# Patient Record
Sex: FEMALE | Race: BLACK OR AFRICAN AMERICAN | Employment: FULL TIME | URBAN - METROPOLITAN AREA
[De-identification: names, ages, dates, MRNs, and addresses within clinical notes are randomized per-mention and may not be internally consistent; named-entity substitution may affect disease eponyms.]

---

## 2017-08-15 ENCOUNTER — OFFICE VISIT (OUTPATIENT)
Dept: NEUROLOGY | Age: 27
End: 2017-08-15

## 2017-08-15 VITALS
TEMPERATURE: 99.1 F | RESPIRATION RATE: 12 BRPM | SYSTOLIC BLOOD PRESSURE: 98 MMHG | DIASTOLIC BLOOD PRESSURE: 60 MMHG | WEIGHT: 120 LBS | BODY MASS INDEX: 23.56 KG/M2 | HEIGHT: 60 IN | OXYGEN SATURATION: 98 % | HEART RATE: 114 BPM

## 2017-08-15 DIAGNOSIS — I95.1 ORTHOSTATIC HYPOTENSION: Primary | ICD-10-CM

## 2017-08-15 NOTE — PROGRESS NOTES
Shilo Quevedo is a 32 y.o., right handed female, with no significant medical history except for some low blood pressure in the past, who comes in complaining of dizziness since she became pregnant about 3 months ago. She has a combination of light headedness and the sense the room is spinning about her. She's just 3 months pregnant. This seems to occur predominantly when she goes from the laying or sitting position to standing. This seems to even if she gets up slowly. This will only rarely happen if she's sitting still, mostly occurring with position change. She hasn't lost a lot of weight recently. She's not on a salt restricted diet. She did have one episode of loss of consciousness with severe lightheadedness associated with position changes. Social History; she's , lives with her  and children. She doesn't smoke, drink or use illicit drugs. Works as a . Family History; mother with migraine headaches. Father  from an accident. Brothers all 3 in good health. Current Outpatient Prescriptions   Medication Sig Dispense Refill    DOCOSAHEXANOIC ACID (PRENATAL DHA PO) Take  by mouth. No past medical history on file. No past surgical history on file. No Known Allergies    There is no problem list on file for this patient.         Review of Systems:   As above otherwise 11 point review of systems negative including;   Constitutional no fever or chills  Skin denies rash or itching  HENT  Denies tinnitus, hearing lose  Eyes denies diplopia vision lose  Respiratory denies shortness of breath  Cardiovascular denies chest pain, dyspnea on exertion  Gastrointestinal denies nausea, vomiting, diarrhea, constipation  Genitourinary denies incontinence  Musculoskeletal denies joint pain or swelling  Endocrine denies weight change  Hematology denies easy bruising or bleeding   Neurological as above in HPI      PHYSICAL EXAMINATION:      VITAL SIGNS:    Visit Vitals    BP 98/60 (BP 1 Location: Left arm, BP Patient Position: Standing)    Pulse (!) 114    Temp 99.1 °F (37.3 °C) (Oral)    Resp 12    Ht 5' (1.524 m)    Wt 54.4 kg (120 lb)    SpO2 98%    BMI 23.44 kg/m2     BP laying 118/62 pulse was 80  BP standing 98/60 pulse was 114. GENERAL: The patient is well developed, well nourished, and in no apparent distress. EXTREMITIES: No clubbing, cyanosis, or edema is identified. Pulses 2+ and symmetrical.  Muscle tone is normal.  HEAD:   Ear, nose, and throat appear to be without trauma. The patient is normocephalic. NEUROLOGIC EXAMINATION    MENTAL STATUS: The patient is awake, alert, and oriented x 4. Fund of knowledge is adequate. Speech is fluent and memory appears to be intact, both long and short term. CRANIAL NERVES: II  Visual fields are full to confrontation. Funduscopic examination reveals flat disks bilaterally. Pupils are both 4 mm and briskly reactive to light and accommodation. III, IV, VI  Extraocular movements are intact and there is no nystagmus. V  Facial sensation is intact to pinprick and light touch. VII  Face is symmetrical.   VIII - Hearing is present. IX, X, [address] rises symmetrically. Gag is present. Tongue is in the midline. XI - Shoulder shrugging and head turning intact  MOTOR:  The patient is 5/5 in all four limbs without any drift. Fine finger movements are symmetrical.  Isolated motor group testing reveals no focal abnormalities. Tone is normal.  Sensory examination is intact to pinprick, light touch and position sense testing. Reflexes are 2+ and symmetrical. Plantars are down going. Cerebellar examination reveals no gross ataxia or dysmetria. Gait is normal and the patient can tandem walk without any difficulty.        CBC: No results found for: WBC, RBC, HGB, HCT, PLT, HGBEXT, HCTEXT, PLTEXT  BMP: No results found for: GLU, NA, K, CL, CO2, BUN, CREA, CA  CMP: No results found for: GLU, NA, K, CL, CO2, BUN, CREA, CA, AGAP, BUCR, TBIL, GPT, AP, TP, ALB, GLOB, AGRAT  Coagulation: No results found for: PTP, INR, APTT, PTTT  Cardiac markers: No results found for: CPK, CKND1, JOSÉ MIGUEL       Impression: Significant orthostatic drop in her blood pressure of 20 mmHg in this patient who has risk factors including pre-existing hypotension. She seems to be symptomatic with a drop in her BP of 20 mmHg. Plan: From my standpoint there's not much to do except encourage her to stay hydrated. Hopefull as she gains weight with this pregnancy the orthostatic drops are better. I'd be hesitant to prescribe any agent to try to raise her BP. [place] use of table salt may help. With little to offer will see here as needed only.

## 2017-08-15 NOTE — MR AVS SNAPSHOT
Visit Information Date & Time Provider Department Dept. Phone Encounter #  
 8/15/2017 10:00 AM Regulo Bowden MD [facility] at [facility] [id number] Follow-up Instructions Return if symptoms worsen or fail to improve. Follow-up and Disposition History Upcoming Health Maintenance Date Due DTaP/Tdap/Td series (1 - Tdap) 1/18/2011 PAP AKA CERVICAL CYTOLOGY 1/18/2011 INFLUENZA AGE 9 TO ADULT 8/1/2017 Allergies as of 8/15/2017  Review Complete On: 8/15/2017 By: Lily Thompson LPN No Known Allergies Current Immunizations  Never Reviewed No immunizations on file. Not reviewed this visit You Were Diagnosed With   
  
 Codes Comments Orthostatic hypotension    -  Primary ICD-10-CM: I95.1 ICD-9-CM: 458.0 Vitals BP Pulse Temp Resp Height(growth percentile) Weight(growth percentile) 98/60 (BP 1 Location: Left arm, BP Patient Position: Standing) (!) 114 99.1 °F (37.3 °C) (Oral) 12 5' (1.524 m) 120 lb (54.4 kg) SpO2 BMI OB Status Smoking Status 98% 23.44 kg/m2 Pregnant Never Smoker Vitals History BMI and BSA Data Body Mass Index Body Surface Area  
 23.44 kg/m 2 1.52 m 2 Your Updated Medication List  
  
   
This list is accurate as of: 8/15/17 10:56 AM.  Always use your most recent med list.  
  
  
  
  
 PRENATAL DHA PO Take  by mouth. Follow-up Instructions Return if symptoms worsen or fail to improve. Introducing [facility]! Bruna Florez introduces [employer] patient portal. Now you can access parts of your medical record, email your doctor's office, and request medication refills online. 1. In your internet browser, go to [email or web address] 2. Click on the First Time User? Click Here link in the Sign In box. You will see the New Member Sign Up page. 3. Enter your [employer] Access Code exactly as it appears below.  You will not need to use this code after youve completed the sign-up process. If you do not sign up before the expiration date, you must request a new code. · [employer] Access Code: [email or web address] Expires: 11/13/2017 10:03 AM 
 
4. Enter the last four digits of your Social Security Number (xxxx) and Date of Birth (mm/dd/yyyy) as indicated and click Submit. You will be taken to the next sign-up page. 5. Create a [employer]) ID. This will be your [employer] login ID and cannot be changed, so think of one that is secure and easy to remember. 6. Create a [employer] password. You can change your password at any time. 7. Enter your Password Reset Question and Answer. This can be used at a later time if you forget your password. 8. Enter your e-mail address. You will receive e-mail notification when new information is available in [address]. 9. Click Sign Up. You can now view and download portions of your medical record. 10. Click the Download Summary menu link to download a portable copy of your medical information. If you have questions, please visit the Frequently Asked Questions section of the [employer] website. Remember, [employer] is NOT to be used for urgent needs. For medical emergencies, dial 911. Now available from your iPhone and Android! Please provide this summary of care documentation to your next provider. If you have any questions after today's visit, please call [phone].

## 2017-08-15 NOTE — COMMUNICATION BODY
Maria Luisa Laird is a 32 y.o., right handed female, with no significant medical history except for some low blood pressure in the past, who comes in complaining of dizziness since she became pregnant about 3 months ago. She has a combination of light headedness and the sense the room is spinning about her. She's just 3 months pregnant. This seems to occur predominantly when she goes from the laying or sitting position to standing. This seems to even if she gets up slowly. This will only rarely happen if she's sitting still, mostly occurring with position change. She hasn't lost a lot of weight recently. She's not on a salt restricted diet. She did have one episode of loss of consciousness with severe lightheadedness associated with position changes. Social History; she's , lives with her  and children. She doesn't smoke, drink or use illicit drugs. Works as a . Family History; mother with migraine headaches. Father  from an accident. Brothers all 3 in good health. Current Outpatient Prescriptions   Medication Sig Dispense Refill    DOCOSAHEXANOIC ACID (PRENATAL DHA PO) Take  by mouth. No past medical history on file. No past surgical history on file. No Known Allergies    There is no problem list on file for this patient.         Review of Systems:   As above otherwise 11 point review of systems negative including;   Constitutional no fever or chills  Skin denies rash or itching  HENT  Denies tinnitus, hearing lose  Eyes denies diplopia vision lose  Respiratory denies shortness of breath  Cardiovascular denies chest pain, dyspnea on exertion  Gastrointestinal denies nausea, vomiting, diarrhea, constipation  Genitourinary denies incontinence  Musculoskeletal denies joint pain or swelling  Endocrine denies weight change  Hematology denies easy bruising or bleeding   Neurological as above in HPI      PHYSICAL EXAMINATION:      VITAL SIGNS:    Visit Vitals    BP 98/60 (BP 1 Location: Left arm, BP Patient Position: Standing)    Pulse (!) 114    Temp 99.1 °F (37.3 °C) (Oral)    Resp 12    Ht 5' (1.524 m)    Wt 54.4 kg (120 lb)    SpO2 98%    BMI 23.44 kg/m2     BP laying 118/62 pulse was 80  BP standing 98/60 pulse was 114. GENERAL: The patient is well developed, well nourished, and in no apparent distress. EXTREMITIES: No clubbing, cyanosis, or edema is identified. Pulses 2+ and symmetrical.  Muscle tone is normal.  HEAD:   Ear, nose, and throat appear to be without trauma. The patient is normocephalic. NEUROLOGIC EXAMINATION    MENTAL STATUS: The patient is awake, alert, and oriented x 4. Fund of knowledge is adequate. Speech is fluent and memory appears to be intact, both long and short term. CRANIAL NERVES: II  Visual fields are full to confrontation. Funduscopic examination reveals flat disks bilaterally. Pupils are both 4 mm and briskly reactive to light and accommodation. III, IV, VI  Extraocular movements are intact and there is no nystagmus. V  Facial sensation is intact to pinprick and light touch. VII  Face is symmetrical.   VIII - Hearing is present. IX, X, [address] rises symmetrically. Gag is present. Tongue is in the midline. XI - Shoulder shrugging and head turning intact  MOTOR:  The patient is 5/5 in all four limbs without any drift. Fine finger movements are symmetrical.  Isolated motor group testing reveals no focal abnormalities. Tone is normal.  Sensory examination is intact to pinprick, light touch and position sense testing. Reflexes are 2+ and symmetrical. Plantars are down going. Cerebellar examination reveals no gross ataxia or dysmetria. Gait is normal and the patient can tandem walk without any difficulty.        CBC: No results found for: WBC, RBC, HGB, HCT, PLT, HGBEXT, HCTEXT, PLTEXT  BMP: No results found for: GLU, NA, K, CL, CO2, BUN, CREA, CA  CMP: No results found for: GLU, NA, K, CL, CO2, BUN, CREA, CA, AGAP, BUCR, TBIL, GPT, AP, TP, ALB, GLOB, AGRAT  Coagulation: No results found for: PTP, INR, APTT, PTTT  Cardiac markers: No results found for: CPK, CKND1, JOSÉ MIGUEL       Impression: Significant orthostatic drop in her blood pressure of 20 mmHg in this patient who has risk factors including pre-existing hypotension. She seems to be symptomatic with a drop in her BP of 20 mmHg. Plan: From my standpoint there's not much to do except encourage her to stay hydrated. Hopefull as she gains weight with this pregnancy the orthostatic drops are better. I'd be hesitant to prescribe any agent to try to raise her BP. [place] use of table salt may help. With little to offer will see here as needed only.

## 2017-08-15 NOTE — LETTER
8/15/2017 10:52 AM 
 
Patient:  Joana Guadalupe YOB: 1990 Date of Visit: 8/15/2017 Dear Alvarez Kirkpatrick, GLENN 
[address] [facility] [address] [id number] [place] [id number] VIA Facsimile: [phone] 
 : Thank you for referring Ms. Joana Guadalupe to me for evaluation/treatment. Below are the relevant portions of my assessment and plan of care. Joana Guadalupe is a 32 y.o., right handed female, with no significant medical history except for some low blood pressure in the past, who comes in complaining of dizziness since she became pregnant about 3 months ago. She has a combination of light headedness and the sense the room is spinning about her. She's just 3 months pregnant. This seems to occur predominantly when she goes from the laying or sitting position to standing. This seems to even if she gets up slowly. This will only rarely happen if she's sitting still, mostly occurring with position change. She hasn't lost a lot of weight recently. She's not on a salt restricted diet. She did have one episode of loss of consciousness with severe lightheadedness associated with position changes. Social History; she's , lives with her  and children. She doesn't smoke, drink or use illicit drugs. Works as a . Family History; mother with migraine headaches. Father  from an accident. Brothers all 3 in good health. Current Outpatient Prescriptions Medication Sig Dispense Refill  DOCOSAHEXANOIC ACID (PRENATAL DHA PO) Take  by mouth. No past medical history on file. No past surgical history on file. No Known Allergies There is no problem list on file for this patient. Review of Systems: As above otherwise 11 point review of systems negative including;  
Constitutional no fever or chills Skin denies rash or itching HENT  Denies tinnitus, hearing lose Eyes denies diplopia vision lose Respiratory denies shortness of breath Cardiovascular denies chest pain, dyspnea on exertion Gastrointestinal denies nausea, vomiting, diarrhea, constipation Genitourinary denies incontinence Musculoskeletal denies joint pain or swelling Endocrine denies weight change Hematology denies easy bruising or bleeding Neurological as above in HPI PHYSICAL EXAMINATION:   
 
VITAL SIGNS:   
Visit Vitals  BP 98/60 (BP 1 Location: Left arm, BP Patient Position: Standing)  Pulse (!) 114  Temp 99.1 °F (37.3 °C) (Oral)  Resp 12  Ht 5' (1.524 m)  Wt 54.4 kg (120 lb)  SpO2 98%  BMI 23.44 kg/m2 BP laying 118/62 pulse was 80 BP standing 98/60 pulse was 114. GENERAL: The patient is well developed, well nourished, and in no apparent distress. EXTREMITIES: No clubbing, cyanosis, or edema is identified. Pulses 2+ and symmetrical.  Muscle tone is normal. 
HEAD:   Ear, nose, and throat appear to be without trauma. The patient is normocephalic. NEUROLOGIC EXAMINATION 
 
MENTAL STATUS: The patient is awake, alert, and oriented x 4. Fund of knowledge is adequate. Speech is fluent and memory appears to be intact, both long and short term. CRANIAL NERVES: II  Visual fields are full to confrontation. Funduscopic examination reveals flat disks bilaterally. Pupils are both 4 mm and briskly reactive to light and accommodation. III, IV, VI  Extraocular movements are intact and there is no nystagmus. V  Facial sensation is intact to pinprick and light touch. VII  Face is symmetrical.  
VIII - Hearing is present. IX, X, [address] rises symmetrically. Gag is present. Tongue is in the midline. XI - Shoulder shrugging and head turning intact MOTOR:  The patient is 5/5 in all four limbs without any drift. Fine finger movements are symmetrical.  Isolated motor group testing reveals no focal abnormalities.   Tone is normal.  Sensory examination is intact to pinprick, light touch and position sense testing. Reflexes are 2+ and symmetrical. Plantars are down going. Cerebellar examination reveals no gross ataxia or dysmetria. Gait is normal and the patient can tandem walk without any difficulty. CBC: No results found for: WBC, RBC, HGB, HCT, PLT, HGBEXT, HCTEXT, PLTEXT 
BMP: No results found for: GLU, NA, K, CL, CO2, BUN, CREA, CA 
CMP: No results found for: GLU, NA, K, CL, CO2, BUN, CREA, CA, AGAP, BUCR, TBIL, GPT, AP, TP, ALB, GLOB, AGRAT Coagulation: No results found for: PTP, INR, APTT, PTTT Cardiac markers: No results found for: CPK, CKND1, JOSÉ MIGUEL Impression: Significant orthostatic drop in her blood pressure of 20 mmHg in this patient who has risk factors including pre-existing hypotension. She seems to be symptomatic with a drop in her BP of 20 mmHg. Plan: From my standpoint there's not much to do except encourage her to stay hydrated. Hopefull as she gains weight with this pregnancy the orthostatic drops are better. I'd be hesitant to prescribe any agent to try to raise her BP. [place] use of table salt may help. With little to offer will see here as needed only. If you have questions, please do not hesitate to call me. I look forward to following MsJuliet Sin Monaco along with you.  
 
 
 
Sincerely, 
 
 
Phillip Gallo MD

## 2017-10-11 ENCOUNTER — TELEPHONE (OUTPATIENT)
Dept: NEUROLOGY | Age: 27
End: 2017-10-11

## 2017-10-16 NOTE — TELEPHONE ENCOUNTER
Second attempt from the [facility] for disability on desk.

## 2022-03-23 ENCOUNTER — HOSPITAL ENCOUNTER (EMERGENCY)
Age: 32
Discharge: HOME | End: 2022-03-23
Attending: EMERGENCY MEDICINE
Payer: OTHER MISCELLANEOUS

## 2022-03-23 VITALS
TEMPERATURE: 98.2 F | RESPIRATION RATE: 16 BRPM | SYSTOLIC BLOOD PRESSURE: 121 MMHG | DIASTOLIC BLOOD PRESSURE: 84 MMHG | HEIGHT: 60 IN | OXYGEN SATURATION: 98 % | HEART RATE: 78 BPM | BODY MASS INDEX: 23.75 KG/M2 | WEIGHT: 121 LBS

## 2022-03-23 DIAGNOSIS — L25.9 CONTACT DERMATITIS, UNSPECIFIED CONTACT DERMATITIS TYPE, UNSPECIFIED TRIGGER: Primary | ICD-10-CM

## 2022-03-23 PROCEDURE — 99282 EMERGENCY DEPT VISIT SF MDM: CPT

## 2022-03-23 RX ORDER — LANOLIN ALCOHOL/MO/W.PET/CERES
325 CREAM (GRAM) TOPICAL DAILY
COMMUNITY
Start: 2021-04-30

## 2022-03-23 NOTE — DISCHARGE INSTRUCTIONS
Return for pain, fever not resolving with motrin or tylenol, shortness of breath, vomiting, decreased fluid intake, weakness, numbness, dizziness, or any change or concerns. Use cortisone topically and benadryl tablets by mouth for itching/irritation both as directed over the counter.

## 2022-03-23 NOTE — ED PROVIDER NOTES
Pt c/o rash/irrtation to face after did n95 fitting at work 2 days ago, says had rxn to spray. No sob, no diff swallowing. No sim rash in past.  Tried vasoline on it this am, still itchy/irritated per pt. No fever/chills  Sx wax/waning. History reviewed. No pertinent past medical history. History reviewed. No pertinent surgical history. History reviewed. No pertinent family history. Social History     Socioeconomic History    Marital status:      Spouse name: Not on file    Number of children: Not on file    Years of education: Not on file    Highest education level: Not on file   Occupational History    Not on file   Tobacco Use    Smoking status: Never Smoker    Smokeless tobacco: Never Used   Substance and Sexual Activity    Alcohol use: No    Drug use: Never    Sexual activity: Not on file   Other Topics Concern    Not on file   Social History Narrative    Not on file     Social Determinants of Health     Financial Resource Strain:     Difficulty of Paying Living Expenses: Not on file   Food Insecurity:     Worried About Running Out of Food in the Last Year: Not on file    Bette of Food in the Last Year: Not on file   Transportation Needs:     Lack of Transportation (Medical): Not on file    Lack of Transportation (Non-Medical):  Not on file   Physical Activity:     Days of Exercise per Week: Not on file    Minutes of Exercise per Session: Not on file   Stress:     Feeling of Stress : Not on file   Social Connections:     Frequency of Communication with Friends and Family: Not on file    Frequency of Social Gatherings with Friends and Family: Not on file    Attends Protestant Services: Not on file    Active Member of Clubs or Organizations: Not on file    Attends Club or Organization Meetings: Not on file    Marital Status: Not on file   Intimate Partner Violence:     Fear of Current or Ex-Partner: Not on file    Emotionally Abused: Not on file   Bill Connelly Physically Abused: Not on file    Sexually Abused: Not on file   Housing Stability:     Unable to Pay for Housing in the Last Year: Not on file    Number of Places Lived in the Last Year: Not on file    Unstable Housing in the Last Year: Not on file         ALLERGIES: Patient has no known allergies. Review of Systems   Constitutional: Negative for fever. Respiratory: Negative for cough and shortness of breath. Cardiovascular: Negative for chest pain. Gastrointestinal: Negative for rectal pain. Skin: Positive for rash. All other systems reviewed and are negative. Vitals:    03/23/22 1808   BP: 123/82   Pulse: 79   Resp: 18   Temp: 98.2 °F (36.8 °C)   SpO2: 97%   Weight: 54.9 kg (121 lb)   Height: 5' (1.524 m)            Physical Exam  Vitals and nursing note reviewed. Constitutional:       Appearance: She is well-developed. She is not diaphoretic. HENT:      Head: Normocephalic and atraumatic. Mouth/Throat:      Mouth: Mucous membranes are moist.      Pharynx: Oropharynx is clear. No oropharyngeal exudate or posterior oropharyngeal erythema. Comments: No swelling  Eyes:      Conjunctiva/sclera: Conjunctivae normal.   Cardiovascular:      Rate and Rhythm: Normal rate and regular rhythm. Pulses: Normal pulses. Heart sounds: No murmur heard. Pulmonary:      Effort: Pulmonary effort is normal.   Musculoskeletal:         General: No tenderness. Cervical back: Normal range of motion. Skin:     General: Skin is dry. Capillary Refill: Capillary refill takes less than 2 seconds. Findings: Rash present. Comments: + scattered erythematous mac/papular mild rash to low cheeks/upper ant neck b/l  No induration/fluctuance/ttp/crepitus   Neurological:      General: No focal deficit present. Mental Status: She is alert and oriented to person, place, and time.    Psychiatric:         Mood and Affect: Mood normal.          MDM Procedures      Vitals:  Patient Vitals for the past 12 hrs:   Temp Pulse Resp BP SpO2   03/23/22 1808 98.2 °F (36.8 °C) 79 18 123/82 97 %         Medications ordered:   Medications - No data to display      Lab findings:  No results found for this or any previous visit (from the past 12 hour(s)). X-Ray, CT or other radiology findings or impressions:  No orders to display       Progress notes, Consult notes or additional Procedure notes:   C/w cont dermatitis. No emc. No s/o cardiopulm or intraoral sx's, stable for topical steriods/benadryl and close f/u. Urged to avoid irritants in future. No emc. Diagnosis:   1. Contact dermatitis, unspecified contact dermatitis type, unspecified trigger        Disposition: home    Follow-up Information     Follow up With Specialties Details Why Contact Info    [facility] EMERGENCY DEPT Emergency Medicine Go to  As needed, If symptoms worsen [address] MD Family Medicine Schedule an appointment as soon as possible for a visit in 2 days  [place] [id number]  [phone]             Patient's Medications   Start Taking    No medications on file   Continue Taking    FERROUS SULFATE 325 MG (65 MG IRON) TABLET    Take 325 mg by mouth daily. These Medications have changed    No medications on file   Stop Taking    DOCOSAHEXANOIC ACID (PRENATAL DHA PO)    Take  by mouth.

## 2022-03-23 NOTE — ED TRIAGE NOTES
Pt had an N-95 fitting 2 days ago, yesterday woke up and had itchy rash on her face. Has not taken anything for this but has used Vaseline. Pt noted to have itchy raised areas on bilateral jaw line.

## 2022-03-24 NOTE — ED NOTES
Received patient from day shift. No distress noted. Patient waiting to see physician. Will continue to monitor.